# Patient Record
Sex: MALE | Race: WHITE | URBAN - NONMETROPOLITAN AREA
[De-identification: names, ages, dates, MRNs, and addresses within clinical notes are randomized per-mention and may not be internally consistent; named-entity substitution may affect disease eponyms.]

---

## 2020-02-05 ENCOUNTER — OFFICE VISIT (OUTPATIENT)
Dept: INTERNAL MEDICINE CLINIC | Age: 40
End: 2020-02-05
Payer: COMMERCIAL

## 2020-02-05 VITALS
SYSTOLIC BLOOD PRESSURE: 160 MMHG | BODY MASS INDEX: 33.13 KG/M2 | DIASTOLIC BLOOD PRESSURE: 92 MMHG | OXYGEN SATURATION: 97 % | WEIGHT: 250 LBS | HEART RATE: 103 BPM | TEMPERATURE: 100.1 F | HEIGHT: 73 IN

## 2020-02-05 LAB
INFLUENZA VIRUS A RNA: ABNORMAL
INFLUENZA VIRUS B RNA: POSITIVE

## 2020-02-05 PROCEDURE — 87502 INFLUENZA DNA AMP PROBE: CPT | Performed by: NURSE PRACTITIONER

## 2020-02-05 PROCEDURE — 99213 OFFICE O/P EST LOW 20 MIN: CPT | Performed by: NURSE PRACTITIONER

## 2020-02-05 RX ORDER — OSELTAMIVIR PHOSPHATE 75 MG/1
75 CAPSULE ORAL 2 TIMES DAILY
Qty: 10 CAPSULE | Refills: 0 | Status: SHIPPED | OUTPATIENT
Start: 2020-02-05 | End: 2020-02-10

## 2020-02-05 ASSESSMENT — PATIENT HEALTH QUESTIONNAIRE - PHQ9
SUM OF ALL RESPONSES TO PHQ9 QUESTIONS 1 & 2: 0
2. FEELING DOWN, DEPRESSED OR HOPELESS: 0
1. LITTLE INTEREST OR PLEASURE IN DOING THINGS: 0
SUM OF ALL RESPONSES TO PHQ QUESTIONS 1-9: 0
SUM OF ALL RESPONSES TO PHQ QUESTIONS 1-9: 0

## 2020-02-05 ASSESSMENT — ENCOUNTER SYMPTOMS
RHINORRHEA: 1
SINUS PAIN: 0
EYES NEGATIVE: 1
SORE THROAT: 0
COUGH: 1
SINUS PRESSURE: 0

## 2020-02-05 NOTE — PROGRESS NOTES
activity:     Days per week: Not on file     Minutes per session: Not on file    Stress: Not on file   Relationships    Social connections:     Talks on phone: Not on file     Gets together: Not on file     Attends Worship service: Not on file     Active member of club or organization: Not on file     Attends meetings of clubs or organizations: Not on file     Relationship status: Not on file    Intimate partner violence:     Fear of current or ex partner: Not on file     Emotionally abused: Not on file     Physically abused: Not on file     Forced sexual activity: Not on file   Other Topics Concern    Not on file   Social History Narrative    Not on file        No family history on file. Vitals:    02/05/20 1154 02/05/20 1219   BP: (!) 178/100 (!) 160/92   Site:  Left Upper Arm   Position:  Sitting   Pulse: 103    Temp: 100.1 °F (37.8 °C)    SpO2: 97%    Weight: 250 lb (113.4 kg)    Height: 6' 1\" (1.854 m)      Estimated body mass index is 32.98 kg/m² as calculated from the following:    Height as of this encounter: 6' 1\" (1.854 m). Weight as of this encounter: 250 lb (113.4 kg). Physical Exam  Vitals signs reviewed. Constitutional:       Appearance: He is well-developed. He is ill-appearing. HENT:      Head: Normocephalic and atraumatic. Nose: Rhinorrhea present. Mouth/Throat:      Mouth: Mucous membranes are moist.      Pharynx: Oropharynx is clear. Eyes:      Conjunctiva/sclera: Conjunctivae normal.   Neck:      Musculoskeletal: Neck supple. Cardiovascular:      Rate and Rhythm: Normal rate and regular rhythm. Heart sounds: Normal heart sounds. Pulmonary:      Effort: Pulmonary effort is normal.      Breath sounds: Normal breath sounds. Skin:     General: Skin is warm and dry. Neurological:      Mental Status: He is alert and oriented to person, place, and time.    Psychiatric:         Mood and Affect: Mood normal.         Behavior: Behavior normal. ASSESSMENT/PLAN:  1. Influenza B    - POCT Influenza A/B DNA (Alere i)  Results for orders placed or performed in visit on 02/05/20   POCT Influenza A/B DNA (Alere i)   Result Value Ref Range    Influenza virus A RNA neg     Influenza virus B RNA positive        - oseltamivir (TAMIFLU) 75 MG capsule; Take 1 capsule by mouth 2 times daily for 5 days  Dispense: 10 capsule; Refill: 0    2. Blood pressure elevated without history of HTN    *PT ENCOURAGED TO MONITOR BP AT HOME X 2 WEEKS, LOG AND F/U WITH PCP. NEEDS TO ESTABLISH WITH PCP. OFFERED TO SCHEDULE WITH DR. LOWE, PT STATES WILL IN FUTURE. Return if symptoms worsen or fail to improve. An  electronic signature was used to authenticate this note.     --EVELYNE Workman - CNP on 2/5/2020 at 6:41 PM

## 2020-02-05 NOTE — PATIENT INSTRUCTIONS
your doctor if:    · You begin to get better and then get worse.     · You are not getting better after 1 week. Where can you learn more? Go to [email or web address] and sign in to your [email or web address] account. Enter S321 in the [employer] box to learn more about \"Influenza (Flu): Care Instructions. \"     If you do not have an account, please click on the \"Sign Up Now\" link. Current as of: June 9, 2019  Content Version: 12.3  © 7200-0020 [employer], [employer]orporated. Care instructions adapted under license by [facility] ([facility]). If you have questions about a medical condition or this instruction, always ask your healthcare professional. [address] any warranty or liability for your use of this information.

## 2020-02-05 NOTE — LETTER
[id number] [facility]  [address] [place] [id number]  Phone: [phone]  Fax: [phone]    EVELYNE Downey CNP        February 5, 2020     Patient: Sadie Navarro   YOB: 1980   Date of Visit: 2/5/2020       To Whom it May Concern:    Martina Orr was seen in my clinic on 2/5/2020. He may return to work on 2/8/2020. If you have any questions or concerns, please don't hesitate to call.     Sincerely,           EVELYNE Workman - CNP